# Patient Record
Sex: FEMALE
[De-identification: names, ages, dates, MRNs, and addresses within clinical notes are randomized per-mention and may not be internally consistent; named-entity substitution may affect disease eponyms.]

---

## 2018-11-16 ENCOUNTER — DIAGNOSTIC TRANS (OUTPATIENT)
Dept: OTHER | Age: 78
End: 2018-11-16

## 2018-11-16 ENCOUNTER — HOSPITAL (OUTPATIENT)
Dept: OTHER | Age: 78
End: 2018-11-16
Attending: EMERGENCY MEDICINE

## 2020-01-15 ENCOUNTER — HOSPITAL ENCOUNTER (EMERGENCY)
Age: 80
Discharge: HOME | End: 2020-01-16
Attending: EMERGENCY MEDICINE | Admitting: EMERGENCY MEDICINE

## 2020-01-15 ENCOUNTER — APPOINTMENT (OUTPATIENT)
Dept: CT IMAGING | Age: 80
End: 2020-01-15
Attending: EMERGENCY MEDICINE

## 2020-01-15 DIAGNOSIS — I15.9 SECONDARY HYPERTENSION: ICD-10-CM

## 2020-01-15 DIAGNOSIS — F29 PSYCHOSIS, UNSPECIFIED PSYCHOSIS TYPE (HCC): Primary | ICD-10-CM

## 2020-01-15 LAB
ALBUMIN SERPL-MCNC: 3.3 G/DL (ref 3.4–5)
ALBUMIN/GLOB SERPL: 0.9 {RATIO} (ref 0.8–1.7)
ALP SERPL-CCNC: 81 U/L (ref 45–117)
ALT SERPL-CCNC: 47 U/L (ref 13–56)
AMPHET UR QL SCN: NEGATIVE
ANION GAP SERPL CALC-SCNC: 4 MMOL/L (ref 3–18)
APPEARANCE UR: CLEAR
AST SERPL-CCNC: 28 U/L (ref 10–38)
BACTERIA URNS QL MICRO: NEGATIVE /HPF
BARBITURATES UR QL SCN: NEGATIVE
BASOPHILS # BLD: 0 K/UL (ref 0–0.1)
BASOPHILS NFR BLD: 0 % (ref 0–2)
BENZODIAZ UR QL: NEGATIVE
BILIRUB SERPL-MCNC: 0.6 MG/DL (ref 0.2–1)
BILIRUB UR QL: NEGATIVE
BNP SERPL-MCNC: 870 PG/ML (ref 0–1800)
BUN SERPL-MCNC: 15 MG/DL (ref 7–18)
BUN/CREAT SERPL: 17 (ref 12–20)
CALCIUM SERPL-MCNC: 8.7 MG/DL (ref 8.5–10.1)
CANNABINOIDS UR QL SCN: NEGATIVE
CHLORIDE SERPL-SCNC: 108 MMOL/L (ref 100–111)
CO2 SERPL-SCNC: 28 MMOL/L (ref 21–32)
COCAINE UR QL SCN: NEGATIVE
COLOR UR: YELLOW
CREAT SERPL-MCNC: 0.86 MG/DL (ref 0.6–1.3)
DIFFERENTIAL METHOD BLD: ABNORMAL
EOSINOPHIL # BLD: 0.1 K/UL (ref 0–0.4)
EOSINOPHIL NFR BLD: 2 % (ref 0–5)
EPITH CASTS URNS QL MICRO: NORMAL /LPF (ref 0–5)
ERYTHROCYTE [DISTWIDTH] IN BLOOD BY AUTOMATED COUNT: 13.1 % (ref 11.6–14.5)
ETHANOL SERPL-MCNC: <3 MG/DL (ref 0–3)
GLOBULIN SER CALC-MCNC: 3.8 G/DL (ref 2–4)
GLUCOSE SERPL-MCNC: 96 MG/DL (ref 74–99)
GLUCOSE UR STRIP.AUTO-MCNC: NEGATIVE MG/DL
HCT VFR BLD AUTO: 30.9 % (ref 35–45)
HDSCOM,HDSCOM: NORMAL
HGB BLD-MCNC: 9.7 G/DL (ref 12–16)
HGB UR QL STRIP: NEGATIVE
KETONES UR QL STRIP.AUTO: NEGATIVE MG/DL
LEUKOCYTE ESTERASE UR QL STRIP.AUTO: ABNORMAL
LYMPHOCYTES # BLD: 1.4 K/UL (ref 0.9–3.6)
LYMPHOCYTES NFR BLD: 20 % (ref 21–52)
MCH RBC QN AUTO: 27.3 PG (ref 24–34)
MCHC RBC AUTO-ENTMCNC: 31.4 G/DL (ref 31–37)
MCV RBC AUTO: 87 FL (ref 74–97)
METHADONE UR QL: NEGATIVE
MONOCYTES # BLD: 0.6 K/UL (ref 0.05–1.2)
MONOCYTES NFR BLD: 9 % (ref 3–10)
NEUTS SEG # BLD: 4.8 K/UL (ref 1.8–8)
NEUTS SEG NFR BLD: 69 % (ref 40–73)
NITRITE UR QL STRIP.AUTO: NEGATIVE
OPIATES UR QL: NEGATIVE
PCP UR QL: NEGATIVE
PH UR STRIP: 7 [PH] (ref 5–8)
PLATELET # BLD AUTO: 274 K/UL (ref 135–420)
PMV BLD AUTO: 10.5 FL (ref 9.2–11.8)
POTASSIUM SERPL-SCNC: 4 MMOL/L (ref 3.5–5.5)
PROT SERPL-MCNC: 7.1 G/DL (ref 6.4–8.2)
PROT UR STRIP-MCNC: NEGATIVE MG/DL
RBC # BLD AUTO: 3.55 M/UL (ref 4.2–5.3)
RBC #/AREA URNS HPF: 0 /HPF (ref 0–5)
SODIUM SERPL-SCNC: 140 MMOL/L (ref 136–145)
SP GR UR REFRACTOMETRY: 1.01 (ref 1–1.03)
TSH SERPL DL<=0.05 MIU/L-ACNC: 1.74 UIU/ML (ref 0.36–3.74)
UROBILINOGEN UR QL STRIP.AUTO: 0.2 EU/DL (ref 0.2–1)
WBC # BLD AUTO: 6.9 K/UL (ref 4.6–13.2)
WBC URNS QL MICRO: NORMAL /HPF (ref 0–4)

## 2020-01-15 PROCEDURE — 84443 ASSAY THYROID STIM HORMONE: CPT

## 2020-01-15 PROCEDURE — 81001 URINALYSIS AUTO W/SCOPE: CPT

## 2020-01-15 PROCEDURE — 80053 COMPREHEN METABOLIC PANEL: CPT

## 2020-01-15 PROCEDURE — 85025 COMPLETE CBC W/AUTO DIFF WBC: CPT

## 2020-01-15 PROCEDURE — 70450 CT HEAD/BRAIN W/O DYE: CPT

## 2020-01-15 PROCEDURE — 80307 DRUG TEST PRSMV CHEM ANLYZR: CPT

## 2020-01-15 PROCEDURE — 99285 EMERGENCY DEPT VISIT HI MDM: CPT

## 2020-01-15 PROCEDURE — 83880 ASSAY OF NATRIURETIC PEPTIDE: CPT

## 2020-01-15 RX ORDER — HYDROCHLOROTHIAZIDE 25 MG/1
25 TABLET ORAL DAILY
COMMUNITY

## 2020-01-16 VITALS
RESPIRATION RATE: 16 BRPM | SYSTOLIC BLOOD PRESSURE: 151 MMHG | OXYGEN SATURATION: 98 % | TEMPERATURE: 99.1 F | HEART RATE: 84 BPM | DIASTOLIC BLOOD PRESSURE: 90 MMHG | WEIGHT: 140 LBS | HEIGHT: 63 IN | BODY MASS INDEX: 24.8 KG/M2

## 2020-01-16 PROCEDURE — 74011250637 HC RX REV CODE- 250/637: Performed by: EMERGENCY MEDICINE

## 2020-01-16 RX ORDER — AMLODIPINE BESYLATE 5 MG/1
10 TABLET ORAL DAILY
Status: DISCONTINUED | OUTPATIENT
Start: 2020-01-16 | End: 2020-01-17 | Stop reason: HOSPADM

## 2020-01-16 RX ORDER — MORPHINE SULFATE 4 MG/ML
4 INJECTION, SOLUTION INTRAMUSCULAR; INTRAVENOUS
Status: DISCONTINUED | OUTPATIENT
Start: 2020-01-16 | End: 2020-01-16

## 2020-01-16 RX ORDER — QUETIAPINE FUMARATE 100 MG/1
100 TABLET, FILM COATED ORAL
Status: DISCONTINUED | OUTPATIENT
Start: 2020-01-16 | End: 2020-01-17 | Stop reason: HOSPADM

## 2020-01-16 RX ORDER — ACETAMINOPHEN 500 MG
1000 TABLET ORAL
Status: COMPLETED | OUTPATIENT
Start: 2020-01-16 | End: 2020-01-16

## 2020-01-16 RX ORDER — HYDROCHLOROTHIAZIDE 25 MG/1
25 TABLET ORAL DAILY
Status: DISCONTINUED | OUTPATIENT
Start: 2020-01-16 | End: 2020-01-17 | Stop reason: HOSPADM

## 2020-01-16 RX ADMIN — AMLODIPINE BESYLATE 10 MG: 5 TABLET ORAL at 08:35

## 2020-01-16 RX ADMIN — HYDROCHLOROTHIAZIDE 25 MG: 25 TABLET ORAL at 08:35

## 2020-01-16 RX ADMIN — ACETAMINOPHEN 1000 MG: 500 TABLET, FILM COATED ORAL at 22:26

## 2020-01-16 NOTE — PROGRESS NOTES
[facility] resident paged @ 1600. As of 1700 no return call. Pt's information was faxed to [facility], [place], [address] [place] [address], [place], and [facility] at [facility]. Call placed to [facility] and information was provided to Laurence in the access office.  Information was faxed per Laurence's request.           Geovanna Neri, MSN, RN, ACM-RN   ED Outcomes Manager  [phone] (phone)

## 2020-01-16 NOTE — CONSULTS
Name:             Ranjeet Chowdary                  : 1940  Date:   1/15/2020                                     Time: 2344  Location of patient: Yes[facility] Price of doctor: [facility], [address] [place] [address]  This evaluation was conducted via tele psychiatry with the assistance of onsite staff    Cc: AMS/delusions     HPI   pt 71 y/o CF with unknown psychiatric history. patient reports that she had been working for an [employer]. Reports that she had come to [facility] to purchase a new property. States that she wants to move. Reports that she's been eating and sleeping well at home. States that she was upset earlier because her  didn't show up for a showing. patient states that she had come to the hospital because of leg swelling. patient otherwise denies any si hi or avh.  patient is able to recall today's date, the name of the president and able to perform serial sevens. no significant cognitive deficits on MMSE.  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 Then states that she owns 2 properties in [place] which she can live in. Reports that she just switched from a visa to a master card. Patient unable to provide a coherent history at this time. reports had been seeing a PCP outpatient for her HTN and reports that she takes lasix for the leg swelling. per hx Patient arrives alert and oriented with a large suitcase. States \"I took the bus down from [place] to check on my house in [place] to see if it was ready. I didn't know the place wasn't 24 hours and they called 911 on me. I wanted to wait until the morning so I could get back on the [employer] but they don't open till 4AM.\" Patient does not want this nurse to call her daughter.         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 , Disp: , Rfl:     amlodipine besylate (AMLODIPINE PO), Take  by mouth., Disp: , Rfl:        allergies: Allergies   Allergen Reactions    Codeine Anaphylaxis        substance hx:  uds 0 bal 0  denies any tobacco usage  denies any hx of aa or rehab in the past     legal: none        social hx:  reports lives alone, homeless  has a daughter who had no contact with for 1 year  reports being employed with a [employer] company. denies any disability              ROS  Eyes: normal/no change  ENT: normal/no change  Respiratory: normal/no change  Cardiovascular: normal/no change  GI: normal/no change  : normal/no change  Endocrine: normal/no change  Musculoskeletal: normal gait/tone  Neurologic: normal/no change  Skin : normal/no abnormalities  ADLs: independent     Mental Status Exam  Level of alertness: alert  Orientation: awake, alert, oriented X2  Appearance: disheveled  Mood: euthymic  Affect: congruent  Gait: normal  Psychomotor State: slowed  Attitude: guarded  SI/HI: no si no hi  Sleep:     Hours slept: 6     Sleep: sleep cont.  disturbance  Speech concerns: normal rate rhythm  Language: verbal  Thought processes: no si no hi no avh  Associations: loose, FOI  Thought content: preservative  Memory:     Short term: impaired     Long term: intact  Attention: fair  Concentration: fair  Intellect: normal  Fund of knowledge: fair  Insight/Judgment: insight fair, judgment fair     Physical Exam  Visit Vitals  /54   Pulse 75   Temp 98.2 °F (36.8 °C)   Resp 16   Ht 5' 3\" (1.6 m)   Wt 63.5 kg (140 lb)   SpO2 99%   BMI 24.80 kg/m²    Musculoskeletal: normal inspection  Neuro/CNS: normal inspection     Considered stroke alert: no     Assessment:     Axis I: 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 patient states had been having an increase in social stressors. patient does not have capacity at this time due to on going delusions. 1. patient will be admitted for safety and stabilization, vol status  2. will place on seroquel 100mg po qhs for psychosis. will also have prn medications available        Psychiatric Clearance: NA  Observation level  no si or hi, continues with circumscribed delusions continue with q15 min checks. Does not need 1:1 SV. Pharmacological:   1.  Therapy: (specifically note recommended therapy, e.g. supportive, substance abuse, etc.)  Therapy  for coping skills and stress management     2.  continue with home meds, Seroquel 100mg po qhs for mood/psychosis     Level of Care: (inpatient, PHP, IOP, outpatient): inpatient once medically cleared.

## 2020-01-16 NOTE — PROGRESS NOTES
Return call received from [employer], crisis worker from [facility]. Per [employer], Dr. Radhika Jo (on-call) psychiatrist feels that the pt is currently manic. Dr. Radhika Jo is only willing to accept the pt on a TDO.            John Keating, MSN, RN, ACM-RN   ED Outcomes Manager  [phone] (phone)

## 2020-01-16 NOTE — ED PROVIDER NOTES
EMERGENCY DEPARTMENT HISTORY AND PHYSICAL EXAM    Date: 1/15/2020  Patient Name: Allen Granados    History of Presenting Illness     Chief Complaint   Patient presents with    Leg Pain    Leg Swelling         History Provided By: Patient and EMS    Chief Complaint: leg swelling  Duration: 3 Weeks  Timing:  Gradual  Location: bilateral lower legs  Quality: Aching  Severity: Moderate  Modifying Factors: none  Associated Symptoms: denies any other associated signs or symptoms      HPI: Allen Granados is a 70 y.o. female with a PMH of hypertension who presents to the ER via EMS after being found standing outside of the commissary after hours. Patient states she took a bus down here from [place] to check in on her house that she owns in [facility]. She states since they were closed she had to stand outside of the store and she was just going to wait there until the bus station opened up in the morning. Patient mildly disheveled in appearance with a large suitcase with her. Patient complaining of bilateral lower leg swelling for the last 3 weeks. Remainder of history somewhat limited due to patient's rambling thoughts. PCP: None    Current Outpatient Medications   Medication Sig Dispense Refill    hydroCHLOROthiazide (HYDRODIURIL) 25 mg tablet Take 25 mg by mouth daily.  amlodipine besylate (AMLODIPINE PO) Take  by mouth. Past History     Past Medical History:  Past Medical History:   Diagnosis Date    Hypertension        Past Surgical History:  History reviewed. No pertinent surgical history. Family History:  History reviewed. No pertinent family history. Social History:  Social History     Tobacco Use    Smoking status: Never Smoker   Substance Use Topics    Alcohol use: Not Currently    Drug use: Not on file       Allergies:   Allergies   Allergen Reactions    Codeine Anaphylaxis         Review of Systems   Review of Systems   Constitutional: Negative for chills, fatigue and fever.   HENT: Negative. Negative for sore throat. Eyes: Negative. Respiratory: Negative for cough and shortness of breath. Cardiovascular: Positive for leg swelling. Negative for chest pain and palpitations. Gastrointestinal: Negative for abdominal pain, nausea and vomiting. Genitourinary: Negative for dysuria. Musculoskeletal: Negative. Skin: Negative. Neurological: Negative for dizziness, weakness, light-headedness and headaches. Psychiatric/Behavioral: Negative. All other systems reviewed and are negative. Physical Exam     Vitals:    01/15/20 2245 01/15/20 2315 01/15/20 2330 01/15/20 2345   BP: 163/56   124/58   Pulse: 67 68 69 72   Resp: 17 15 15 13   Temp:       SpO2: 100% 100% 99% 93%   Weight:       Height:         Physical Exam  Vitals signs and nursing note reviewed. Constitutional:       General: She is not in acute distress. Appearance: Normal appearance. She is well-developed. She is not ill-appearing. Comments: Mildly disheveled   HENT:      Head: Normocephalic and atraumatic. Nose: Nose normal.      Mouth/Throat:      Mouth: Mucous membranes are moist.   Eyes:      General: No scleral icterus. Conjunctiva/sclera: Conjunctivae normal.   Neck:      Musculoskeletal: Normal range of motion and neck supple. Vascular: No JVD. Trachea: No tracheal deviation. Cardiovascular:      Rate and Rhythm: Normal rate and regular rhythm. Heart sounds: Normal heart sounds. Pulmonary:      Effort: Pulmonary effort is normal. No respiratory distress. Breath sounds: Normal breath sounds. No stridor. No wheezing, rhonchi or rales. Chest:      Chest wall: No tenderness. Abdominal:      General: Bowel sounds are normal. There is no distension. Palpations: Abdomen is soft. Tenderness: There is no tenderness. There is no guarding or rebound. Musculoskeletal:         General: Swelling present. No tenderness.       Comments: 2+ pitting edema in BLLE; strong DP and PT pulses BL   Skin:     General: Skin is warm and dry. Capillary Refill: Capillary refill takes less than 2 seconds. Neurological:      Mental Status: She is alert and oriented to person, place, and time. GCS: GCS eye subscore is 4. GCS verbal subscore is 5. GCS motor subscore is 6.       Gait: Gait normal.   Psychiatric:         Mood and Affect: Mood normal.         Speech: Speech is tangential.      Comments: Pt w/ somewhat rambling thoughts           Diagnostic Study Results     Labs -     Recent Results (from the past 12 hour(s))   URINALYSIS W/ RFLX MICROSCOPIC    Collection Time: 01/15/20  9:45 PM   Result Value Ref Range    Color YELLOW      Appearance CLEAR      Specific gravity 1.012 1.005 - 1.030      pH (UA) 7.0 5.0 - 8.0      Protein NEGATIVE  NEG mg/dL    Glucose NEGATIVE  NEG mg/dL    Ketone NEGATIVE  NEG mg/dL    Bilirubin NEGATIVE  NEG      Blood NEGATIVE  NEG      Urobilinogen 0.2 0.2 - 1.0 EU/dL    Nitrites NEGATIVE  NEG      Leukocyte Esterase SMALL (A) NEG     DRUG SCREEN, URINE    Collection Time: 01/15/20  9:45 PM   Result Value Ref Range    BENZODIAZEPINES NEGATIVE  NEG      BARBITURATES NEGATIVE  NEG      THC (TH-CANNABINOL) NEGATIVE  NEG      OPIATES NEGATIVE  NEG      PCP(PHENCYCLIDINE) NEGATIVE  NEG      COCAINE NEGATIVE  NEG      AMPHETAMINES NEGATIVE  NEG      METHADONE NEGATIVE  NEG      HDSCOM (NOTE)    URINE MICROSCOPIC ONLY    Collection Time: 01/15/20  9:45 PM   Result Value Ref Range    WBC 3 to 5 0 - 4 /hpf    RBC 0 0 - 5 /hpf    Epithelial cells FEW 0 - 5 /lpf    Bacteria NEGATIVE  NEG /hpf   CBC WITH AUTOMATED DIFF    Collection Time: 01/15/20 10:31 PM   Result Value Ref Range    WBC 6.9 4.6 - 13.2 K/uL    RBC 3.55 (L) 4.20 - 5.30 M/uL    HGB 9.7 (L) 12.0 - 16.0 g/dL    HCT 30.9 (L) 35.0 - 45.0 %    MCV 87.0 74.0 - 97.0 FL    MCH 27.3 24.0 - 34.0 PG    MCHC 31.4 31.0 - 37.0 g/dL    RDW 13.1 11.6 - 14.5 %    PLATELET 642 990 - 463 K/uL MPV 10.5 9.2 - 11.8 FL    NEUTROPHILS 69 40 - 73 %    LYMPHOCYTES 20 (L) 21 - 52 %    MONOCYTES 9 3 - 10 %    EOSINOPHILS 2 0 - 5 %    BASOPHILS 0 0 - 2 %    ABS. NEUTROPHILS 4.8 1.8 - 8.0 K/UL    ABS. LYMPHOCYTES 1.4 0.9 - 3.6 K/UL    ABS. MONOCYTES 0.6 0.05 - 1.2 K/UL    ABS. EOSINOPHILS 0.1 0.0 - 0.4 K/UL    ABS. BASOPHILS 0.0 0.0 - 0.1 K/UL    DF AUTOMATED     METABOLIC PANEL, COMPREHENSIVE    Collection Time: 01/15/20 10:31 PM   Result Value Ref Range    Sodium 140 136 - 145 mmol/L    Potassium 4.0 3.5 - 5.5 mmol/L    Chloride 108 100 - 111 mmol/L    CO2 28 21 - 32 mmol/L    Anion gap 4 3.0 - 18 mmol/L    Glucose 96 74 - 99 mg/dL    BUN 15 7.0 - 18 MG/DL    Creatinine 0.86 0.6 - 1.3 MG/DL    BUN/Creatinine ratio 17 12 - 20      GFR est AA >60 >60 ml/min/1.73m2    GFR est non-AA >60 >60 ml/min/1.73m2    Calcium 8.7 8.5 - 10.1 MG/DL    Bilirubin, total 0.6 0.2 - 1.0 MG/DL    ALT (SGPT) 47 13 - 56 U/L    AST (SGOT) 28 10 - 38 U/L    Alk. phosphatase 81 45 - 117 U/L    Protein, total 7.1 6.4 - 8.2 g/dL    Albumin 3.3 (L) 3.4 - 5.0 g/dL    Globulin 3.8 2.0 - 4.0 g/dL    A-G Ratio 0.9 0.8 - 1.7     ETHYL ALCOHOL    Collection Time: 01/15/20 10:31 PM   Result Value Ref Range    ALCOHOL(ETHYL),SERUM <3 0 - 3 MG/DL   NT-PRO BNP    Collection Time: 01/15/20 10:31 PM   Result Value Ref Range    NT pro- 0 - 1,800 PG/ML   TSH 3RD GENERATION    Collection Time: 01/15/20 10:31 PM   Result Value Ref Range    TSH 1.74 0.36 - 3.74 uIU/mL       Radiologic Studies -   CT HEAD WO CONT    (Results Pending)     CT Results  (Last 48 hours)    None        CXR Results  (Last 48 hours)    None            Medical Decision Making   I am the first provider for this patient. I reviewed the vital signs, available nursing notes, past medical history, past surgical history, family history and social history. Vital Signs-Reviewed the patient's vital signs.     Records Reviewed: Nursing Notes     835 PM  82-year-old female who presents to the ER via [employer] after being found standing outside of the commissary. Patient is alert and answering questions appropriately. She reports riding the bus from [place] to [place] today to check on a home that she owns however because it was after hours she could not get in. Patient states she had planned on staying at the commissary until the bus station open but did not know what they closed. Patient identifies day of the week location and year and answers most questions appropriately. In no acute distress however reports bilateral lower leg swelling times several weeks. Patient presentation concerning for possible homelessness. Noted to have large suitcase with her by bedside. Called and spoke with daughter who was listed as emergency contact. They have been estranged from each other for quite some time and daughter reports last she heard approximately 1 year ago patient was in [place]. She reports that her mother seem delusional at that time and may have some mental disease. We will plan on labs and medical screening and plan on psychiatric evaluation to determine competency. Discussed patient with ER attending, Dr. Shawnee Brooks who will continue with care after hours. Jelani Maxwell PA-C     11:51 PM  CT head w/ no acute process. All labs reviewed and noted to be stable. Will plan on telepsych consult and place case mgmt consult. Jelani Maxwell PA-C    Disposition:  TBD      Follow-up Information    None         Current Discharge Medication List          Provider Notes (Medical Decision Making):     Procedures:  Procedures        Diagnosis     Clinical Impression:   1.  Localized swelling of both lower legs

## 2020-01-16 NOTE — ED TRIAGE NOTES
Patient arrives by [employer] [facility] states the call was initially for \"pscyh. \" Patient was standing around the commissary when it was closed. Patient arrives alert and oriented with a large suitcase. States \"I took the bus down from [place] to check on my house in [facility] to see if it was ready. I didn't know the place wasn't 24 hours and they called 911 on me. I wanted to wait until the morning so I could get back on the [employer] but they don't open till 4AM.\" Patient does not want this nurse to call her daughter. Asked patient if she had any medical complaints to be seen in the ER and she c/o bilateral leg pain and swelling.

## 2020-01-16 NOTE — PROGRESS NOTES
Call placed to [facility]- spoke with Brandy Orosco, crisis nurse.  Provided pt's MRN and name for review.               Saundra Guzmán, MSN, RN, ACM-RN   ED Outcomes Manager  [phone] (phone)

## 2020-01-17 NOTE — ED NOTES
Patient to be transported to [facility], room 817Z. Called Report to Adryan Vu RN.

## 2020-01-17 NOTE — ED NOTES
Pt upset with transfer, Pt reports that she has lots of money, Pt said  \"[employer] is my . \" Pt also stated that she has a meeting with the president of the [place] in [place] on Jan 17th or maybe Feb 17, I don't actually know. \"   Pt also stated that she just flew from [facility] where she had to go \"to all the moser that owe me money. 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 \"